# Patient Record
Sex: FEMALE | Employment: STUDENT | URBAN - METROPOLITAN AREA
[De-identification: names, ages, dates, MRNs, and addresses within clinical notes are randomized per-mention and may not be internally consistent; named-entity substitution may affect disease eponyms.]

---
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
## 2025-04-27 ENCOUNTER — TELEPHONE (OUTPATIENT)
Dept: FAMILY MEDICINE CLINIC | Facility: CLINIC | Age: 20
End: 2025-04-27

## 2025-04-28 NOTE — TELEPHONE ENCOUNTER
You are receiving this message because you were on call 25, please advise what advice was given to the patient. Please sign the encounter after you document if there is no further action needed.     Message # [id number]         2025 10:54a   [ANNIEW]  To:  From:  RAÚL Samayoa MD:  Phone#:  ----------------------------------------------------------------------  LOVE HERRERA [phone] RE; PT EMILY HERRERA  2005, COVID POSITIVE TEMP 101 PCP YOURSELF Paged at number :  PAGE: [phone] at :  10:54          (Message Delivered)   D E L I V E R I E S :  2025 10:54a           ANNIEW        Delivered

## 2025-04-29 NOTE — TELEPHONE ENCOUNTER
I spoke with the parents and the patient and gave advisement regarding supportive care treatment for COVID.  Prescription sent to the pharmacy.

## 2025-06-19 ENCOUNTER — LAB ENCOUNTER (OUTPATIENT)
Dept: LAB | Age: 20
End: 2025-06-19
Attending: PHYSICIAN ASSISTANT
Payer: COMMERCIAL

## 2025-06-19 ENCOUNTER — OFFICE VISIT (OUTPATIENT)
Dept: FAMILY MEDICINE CLINIC | Facility: CLINIC | Age: 20
End: 2025-06-19
Payer: COMMERCIAL

## 2025-06-19 VITALS
HEART RATE: 72 BPM | HEIGHT: 64.6 IN | BODY MASS INDEX: 29.85 KG/M2 | DIASTOLIC BLOOD PRESSURE: 78 MMHG | WEIGHT: 177 LBS | SYSTOLIC BLOOD PRESSURE: 127 MMHG

## 2025-06-19 DIAGNOSIS — Z11.3 SCREEN FOR STD (SEXUALLY TRANSMITTED DISEASE): ICD-10-CM

## 2025-06-19 DIAGNOSIS — Z00.00 WELLNESS EXAMINATION: Primary | ICD-10-CM

## 2025-06-19 DIAGNOSIS — J30.0 VASOMOTOR RHINITIS: ICD-10-CM

## 2025-06-19 DIAGNOSIS — Z00.00 WELLNESS EXAMINATION: ICD-10-CM

## 2025-06-19 PROBLEM — Z02.0 SCHOOL PHYSICAL EXAM: Status: RESOLVED | Noted: 2019-08-13 | Resolved: 2025-06-19

## 2025-06-19 LAB
ALBUMIN SERPL-MCNC: 4.5 G/DL (ref 3.2–4.8)
ALBUMIN/GLOB SERPL: 1.6 {RATIO} (ref 1–2)
ALP LIVER SERPL-CCNC: 62 U/L (ref 52–144)
ALT SERPL-CCNC: 18 U/L (ref 10–49)
ANION GAP SERPL CALC-SCNC: 7 MMOL/L (ref 0–18)
AST SERPL-CCNC: 17 U/L (ref ?–34)
BASOPHILS # BLD AUTO: 0.05 X10(3) UL (ref 0–0.2)
BASOPHILS NFR BLD AUTO: 0.8 %
BILIRUB SERPL-MCNC: 0.9 MG/DL (ref 0.3–1.2)
BUN BLD-MCNC: 11 MG/DL (ref 9–23)
BUN/CREAT SERPL: 10.7 (ref 10–20)
CALCIUM BLD-MCNC: 10.1 MG/DL (ref 8.7–10.4)
CHLORIDE SERPL-SCNC: 106 MMOL/L (ref 98–112)
CHOLEST SERPL-MCNC: 160 MG/DL (ref ?–200)
CO2 SERPL-SCNC: 25 MMOL/L (ref 21–32)
CREAT BLD-MCNC: 1.03 MG/DL (ref 0.55–1.02)
DEPRECATED RDW RBC AUTO: 39.1 FL (ref 35.1–46.3)
EGFRCR SERPLBLD CKD-EPI 2021: 80 ML/MIN/1.73M2 (ref 60–?)
EOSINOPHIL # BLD AUTO: 0.22 X10(3) UL (ref 0–0.7)
EOSINOPHIL NFR BLD AUTO: 3.4 %
ERYTHROCYTE [DISTWIDTH] IN BLOOD BY AUTOMATED COUNT: 11.5 % (ref 11–15)
FASTING PATIENT LIPID ANSWER: YES
FASTING STATUS PATIENT QL REPORTED: YES
GLOBULIN PLAS-MCNC: 2.9 G/DL (ref 2–3.5)
GLUCOSE BLD-MCNC: 81 MG/DL (ref 70–99)
HCT VFR BLD AUTO: 37.9 % (ref 35–48)
HDLC SERPL-MCNC: 65 MG/DL (ref 40–59)
HGB BLD-MCNC: 12.1 G/DL (ref 12–16)
IMM GRANULOCYTES # BLD AUTO: 0.01 X10(3) UL (ref 0–1)
IMM GRANULOCYTES NFR BLD: 0.2 %
LDLC SERPL CALC-MCNC: 79 MG/DL (ref ?–100)
LYMPHOCYTES # BLD AUTO: 3.24 X10(3) UL (ref 1–4)
LYMPHOCYTES NFR BLD AUTO: 49.8 %
MCH RBC QN AUTO: 29.7 PG (ref 26–34)
MCHC RBC AUTO-ENTMCNC: 31.9 G/DL (ref 31–37)
MCV RBC AUTO: 93.1 FL (ref 80–100)
MONOCYTES # BLD AUTO: 0.4 X10(3) UL (ref 0.1–1)
MONOCYTES NFR BLD AUTO: 6.1 %
NEUTROPHILS # BLD AUTO: 2.59 X10 (3) UL (ref 1.5–7.7)
NEUTROPHILS # BLD AUTO: 2.59 X10(3) UL (ref 1.5–7.7)
NEUTROPHILS NFR BLD AUTO: 39.7 %
NONHDLC SERPL-MCNC: 95 MG/DL (ref ?–130)
OSMOLALITY SERPL CALC.SUM OF ELEC: 284 MOSM/KG (ref 275–295)
PLATELET # BLD AUTO: 416 10(3)UL (ref 150–450)
POTASSIUM SERPL-SCNC: 4.1 MMOL/L (ref 3.5–5.1)
PROT SERPL-MCNC: 7.4 G/DL (ref 5.7–8.2)
RBC # BLD AUTO: 4.07 X10(6)UL (ref 3.8–5.3)
SODIUM SERPL-SCNC: 138 MMOL/L (ref 136–145)
TRIGL SERPL-MCNC: 84 MG/DL (ref 30–149)
TSI SER-ACNC: 1.14 UIU/ML (ref 0.55–4.78)
VLDLC SERPL CALC-MCNC: 13 MG/DL (ref 0–30)
WBC # BLD AUTO: 6.5 X10(3) UL (ref 4–11)

## 2025-06-19 PROCEDURE — 87591 N.GONORRHOEAE DNA AMP PROB: CPT

## 2025-06-19 PROCEDURE — 87491 CHLMYD TRACH DNA AMP PROBE: CPT

## 2025-06-19 PROCEDURE — 85025 COMPLETE CBC W/AUTO DIFF WBC: CPT

## 2025-06-19 PROCEDURE — 36415 COLL VENOUS BLD VENIPUNCTURE: CPT

## 2025-06-19 PROCEDURE — 80053 COMPREHEN METABOLIC PANEL: CPT

## 2025-06-19 PROCEDURE — 86480 TB TEST CELL IMMUN MEASURE: CPT

## 2025-06-19 PROCEDURE — 80061 LIPID PANEL: CPT

## 2025-06-19 PROCEDURE — 84443 ASSAY THYROID STIM HORMONE: CPT

## 2025-06-19 RX ORDER — LORATADINE 10 MG/1
10 TABLET ORAL DAILY
Qty: 90 TABLET | Refills: 2 | Status: SHIPPED | OUTPATIENT
Start: 2025-06-19

## 2025-06-19 NOTE — PROGRESS NOTES
HPI:   Roldan Mcnulty is a 20 year old female who presents for an Annual Health Visit.   The patient is currently feeling well. The patient denies chest pain, SOB, N/V/C/D, fever, dizziness, syncope, and abdominal pain. There are no other concerns today.      Allergies:   Allergies[1]    CURRENT MEDICATIONS   Current Medications[2]   HISTORICAL INFORMATION   Past Medical History[3]   Past Surgical History[4]   Family History[5]   SOCIAL HISTORY   Short Social Hx on File[6]  Social History     Social History Narrative    Not on file        REVIEW OF SYSTEMS:     Review of Systems   Constitutional: Negative.    HENT: Negative.     Eyes: Negative.    Respiratory: Negative.     Cardiovascular: Negative.    Gastrointestinal: Negative.    Genitourinary: Negative.    Musculoskeletal: Negative.    Skin: Negative.    Neurological: Negative.    Psychiatric/Behavioral: Negative.           EXAM:   /78   Pulse 72   Ht 5' 4.6\" (1.641 m)   Wt 177 lb (80.3 kg)   LMP 06/12/2025 (Approximate)   BMI 29.82 kg/m²    Wt Readings from Last 6 Encounters:   06/19/25 177 lb (80.3 kg)   10/23/24 189 lb (85.7 kg) (96%, Z= 1.77)*   10/11/24 180 lb (81.6 kg) (95%, Z= 1.60)*   11/01/22 179 lb 8 oz (81.4 kg) (95%, Z= 1.69)*   04/04/22 175 lb 9.6 oz (79.7 kg) (95%, Z= 1.65)*   10/19/21 174 lb (78.9 kg) (95%, Z= 1.64)*     * Growth percentiles are based on CDC (Girls, 2-20 Years) data.     Body mass index is 29.82 kg/m².    Physical Exam  Vitals reviewed.   Constitutional:       Appearance: She is well-developed.   HENT:      Head: Normocephalic and atraumatic.      Right Ear: Tympanic membrane, ear canal and external ear normal. There is no impacted cerumen.      Left Ear: Tympanic membrane, ear canal and external ear normal. There is no impacted cerumen.      Nose: Nose normal.      Mouth/Throat:      Mouth: Mucous membranes are moist.      Pharynx: Oropharynx is clear. No oropharyngeal exudate or posterior oropharyngeal  erythema.   Eyes:      General:         Right eye: No discharge.         Left eye: No discharge.      Conjunctiva/sclera: Conjunctivae normal.   Cardiovascular:      Rate and Rhythm: Normal rate and regular rhythm.      Heart sounds: Normal heart sounds.   Pulmonary:      Effort: Pulmonary effort is normal.      Breath sounds: Normal breath sounds.   Abdominal:      General: Abdomen is flat. Bowel sounds are normal. There is no distension.      Palpations: Abdomen is soft.      Tenderness: There is no abdominal tenderness. There is no right CVA tenderness or left CVA tenderness.   Genitourinary:     Vagina: Normal.   Musculoskeletal:         General: Normal range of motion.      Cervical back: Normal range of motion and neck supple.   Skin:     Findings: No rash.   Neurological:      Mental Status: She is alert and oriented to person, place, and time.   Psychiatric:         Mood and Affect: Mood normal.         Behavior: Behavior normal.         Thought Content: Thought content normal.         Judgment: Judgment normal.          ASSESSMENT AND PLAN:   Roldan was seen today for routine physical.    Diagnoses and all orders for this visit:    Wellness examination  -     CBC With Differential With Platelet; Future  -     Comp Metabolic Panel (14); Future  -     Lipid Panel; Future  -     TSH W Reflex To Free T4; Future  -     Quantiferon TB Plus; Future    Screen for STD (sexually transmitted disease)  -     Chlamydia/Gc Amplification; Future    Vasomotor rhinitis  -     loratadine 10 MG Oral Tab; Take 1 tablet (10 mg total) by mouth daily.      Overall health discussed, exercise/activity appropriate for age and health status, heathy diety, preventive care, and upcoming screening discussed. Routine labs ordered.    There are no Patient Instructions on file for this visit.    The patient indicates understanding of these issues and agrees to the plan.    Problem List:  Problem List[7]    Ihsan Carranza  ARTHUR  6/19/2025  10:53 AM               [1]   Allergies  Allergen Reactions    Ragweed ITCHING     SNEEZING      [2]   Current Outpatient Medications   Medication Sig Dispense Refill    loratadine 10 MG Oral Tab Take 1 tablet (10 mg total) by mouth daily. 90 tablet 2    ibuprofen 400 MG Oral Tab TAKE 1 DAY BEFORE MENSTRUAL CYCLE ANTICIPATED TO START AND TO BE TAKEN EVERY DAY DURING THE MENSTRUAL CYCLE 3 TIMES DAILY WITH MEALS. (Patient not taking: Reported on 6/19/2025) 15 tablet 0    Etonogestrel-Ethinyl Estradiol (NUVARING) 0.12-0.015 MG/24HR Vaginal Ring Place 1 Ring vaginally As Directed. For 3 weeks, remove for 1 week (Patient not taking: Reported on 6/19/2025) 3 Ring 3    Benzoyl Peroxide (DESQUAM-X WASH) 10 % External Liquid Apply 1 Application topically at bedtime. (Patient not taking: Reported on 6/19/2025) 150 mL 5    Erythromycin 2 % External Pads Apply 1 Application topically at bedtime. To be applied after face washing nightly. (Patient not taking: Reported on 6/19/2025) 60 each 0    Mometasone Furoate (NASONEX) 50 MCG/ACT Nasal Suspension 1 spray by Nasal route daily. (Patient not taking: Reported on 6/19/2025) 17 g 1    Diclofenac Potassium 50 MG Oral Tab Take 1 tablet (50 mg total) by mouth 3 (three) times daily. (Patient not taking: Reported on 6/19/2025) 30 tablet 5    Erythromycin 2 % External Pads Apply 1 Application topically nightly. To be applied after washing face. (Patient not taking: Reported on 6/19/2025) 60 each 2    Benzoyl Peroxide (DESQUAM-X WASH) 5 % External Liquid Apply 1 Application topically nightly. (Patient not taking: Reported on 6/19/2025) 1 Bottle 2   [3]   Past Medical History:   Allergic rhinitis    Dysmenorrhea   [4]   Past Surgical History:  Procedure Laterality Date    Adenoidectomy  11/2010    T&A    Tonsillectomy  11/2010    T&A   [5]   Family History  Problem Relation Age of Onset    Other (Other) Maternal Grandmother         sarcoidosis    Cancer Paternal Grandmother          cancer-unsure of kind   [6]   Social History  Socioeconomic History    Marital status: Single   Tobacco Use    Smoking status: Never    Smokeless tobacco: Never   Vaping Use    Vaping status: Never Used   Substance and Sexual Activity    Alcohol use: No    Drug use: No    Sexual activity: Never   Other Topics Concern    Caffeine Concern Yes     Comment: soda, occasionally   [7]   Patient Active Problem List  Diagnosis    Multiple environmental allergies    Allergic rhinitis    Congestion of nasal sinus    Well child check    School physical exam    Mental or behavioral problem    Motion sickness

## 2025-06-20 LAB
C TRACH DNA SPEC QL NAA+PROBE: NEGATIVE
N GONORRHOEA DNA SPEC QL NAA+PROBE: NEGATIVE

## 2025-06-24 ENCOUNTER — TELEPHONE (OUTPATIENT)
Dept: FAMILY MEDICINE CLINIC | Facility: CLINIC | Age: 20
End: 2025-06-24

## 2025-06-24 LAB
M TB IFN-G CD4+ T-CELLS BLD-ACNC: 0.03 IU/ML
M TB TUBERC IFN-G BLD QL: POSITIVE
M TB TUBERC IGNF/MITOGEN IGNF CONTROL: >10 IU/ML
QFT TB1 AG MINUS NIL: 0 IU/ML
QFT TB2 AG MINUS NIL: >10 IU/ML

## 2025-06-24 NOTE — TELEPHONE ENCOUNTER
This encounter closed.   MyChart sent .     See 6/19/25 LABS ;   So Peñaloza RN  6/24/2025  1:39 PM CDT Back to Top      Left message to call back.  First attempt.    Ihsan Carranza PA-C  6/24/2025  1:21 PM CDT       Quantiferon is positive. Chest x-ray ordered.         No future appointments.

## 2025-06-25 ENCOUNTER — HOSPITAL ENCOUNTER (OUTPATIENT)
Dept: GENERAL RADIOLOGY | Age: 20
Discharge: HOME | End: 2025-06-25
Attending: PHYSICIAN ASSISTANT
Payer: COMMERCIAL

## 2025-06-25 DIAGNOSIS — R76.12 POSITIVE QUANTIFERON-TB GOLD TEST: ICD-10-CM

## 2025-06-25 PROCEDURE — 71046 X-RAY EXAM CHEST 2 VIEWS: CPT | Performed by: PHYSICIAN ASSISTANT

## 2025-06-25 NOTE — TELEPHONE ENCOUNTER
Patient returned our call ( Identified name and date of birth )     Reviewed with patient the following message   Patient verbalizes understanding and agrees with treatment  plan.       Jr Carranza says you tested positive for the TB test (QuantiFERON) and she wants you to get a chest X-ray; the order is in the system. I recommend calling central scheduling at [phone] to schedule your X-ray appointment, mention that you are positive for TB, and wear a mask when you go.

## 2025-06-26 ENCOUNTER — TELEPHONE (OUTPATIENT)
Dept: FAMILY MEDICINE CLINIC | Facility: CLINIC | Age: 20
End: 2025-06-26

## 2025-06-26 DIAGNOSIS — R76.12 POSITIVE QUANTIFERON-TB GOLD TEST: Primary | ICD-10-CM

## 2025-06-26 NOTE — TELEPHONE ENCOUNTER
Patient has questions regarding test result. Wants to know If she should get another test done to check if its false positive.

## 2025-06-27 NOTE — TELEPHONE ENCOUNTER
If this is the first positive quant gold test she's had, given her normal CXR, I have reordered the blood test. She can get this done at her convenience.

## 2025-07-03 ENCOUNTER — LAB ENCOUNTER (OUTPATIENT)
Dept: LAB | Age: 20
End: 2025-07-03
Attending: FAMILY MEDICINE
Payer: COMMERCIAL

## 2025-07-03 DIAGNOSIS — R76.12 POSITIVE QUANTIFERON-TB GOLD TEST: ICD-10-CM

## 2025-07-03 PROCEDURE — 86480 TB TEST CELL IMMUN MEASURE: CPT

## 2025-07-03 PROCEDURE — 36415 COLL VENOUS BLD VENIPUNCTURE: CPT

## 2025-07-05 LAB
M TB IFN-G CD4+ T-CELLS BLD-ACNC: 0.01 IU/ML
M TB TUBERC IFN-G BLD QL: NEGATIVE
M TB TUBERC IGNF/MITOGEN IGNF CONTROL: >10 IU/ML
QFT TB1 AG MINUS NIL: 0 IU/ML
QFT TB2 AG MINUS NIL: 0 IU/ML

## (undated) DIAGNOSIS — N94.6 DYSMENORRHEA IN ADOLESCENT: ICD-10-CM

## (undated) NOTE — LETTER
[facility] [employer] of Child Health Examination       Student's Name  Tang Tong A Birth Da Date     Signature                                                                                                                                              Title                           Date    (If adding dates to the above immu ALLERGIES  (Food, drug, insect, other)  Review of patient's allergies indicates no known allergies.  MEDICATION  (List all prescribed or taken on a regular basis.)    Current Outpatient Prescriptions:   •  Benzoyl Peroxide (DESQUAM-X [address]) 5 % External Liqu age 48 (Cause?)    Yes   No    Eye/Vision problems?   Yes  No   Glasses  Yes   No  Contacts  Yes    No   Last eye exam___  Other concerns? (crossed eye, drooping lids, squinting, difficulty reading) Dental:  ____Braces    ____Bridge    ____Plate    ____Othe Blood Test:   Date Reported          /      /              Result:                  Value ______________               LAB TESTS (Recommended) Date Results  Date Results   Hemoglobin or Hematocrit   Sickle Cell  (when indicated)     Urinalysis   Dev Physician/Advanced Practice Nurse/Physician Assistant performing examination  Print Name  Kaelyn Nguyen, DO                                                 Signature                                                                                Date

## (undated) NOTE — LETTER
Name:  Amaya Mariano Year:  9th Grade Class: Student ID No.:   Address:   [address] [place] [address] Phone:  [phone] (home)  :  15year old   Name Relationship [address] Work Phone Home Phone Mobile Phone   1.  Ever Pen 9.  Has a doctor ever ordered a test for your heart? For example, ECG/EKG. Echocardiogram) No   10. Do you get lightheaded or feel more short of breath than expected during exercise? No   11. Have you ever had an unexplained seizure? No   12.  Do you get mo 26. Do you cough, wheeze, or have difficulty breathing during or after exercise? No   27. Have you ever used an inhaler or taken asthma medication? No   28. Is there anyone in your family who has asthma? No   29.  Were you born without or are you missing a 55. How many periods have you had in the last 12 months? Explain \"yes\" answers here:   ____________________________________            I hereby state that, to the best of my knowledge, my answers to the above questions are complete and correct.  8/13/2 On the basis of the examination on this day, I approve this child's participation in interscholastic sports for 395 days from this date.    Limited:No                                                                    Examination Date: 8/13/2019   Additiona A complete list of the current IHSA Banned Substance Classes can be accessed at http://[email or web address] ihsa.org/initiatives/sportsMedicine/files/IHSA_banned_substance_classes. pdf             Signature of student-athlete Date Signature of parent-guardian Date        ©2

## (undated) NOTE — Clinical Note
2/7/2017          To Whom It May Concern:    Shantal Talbert is currently under my medical care and may not return to school at this time. Please excuse Adelina Orellana for 4 days beginning 01/24/17 and including 01/25/17, 02/07/17, and 02/08/17.   She may retur

## (undated) NOTE — LETTER
10/23/2024        Roldan Mcnulty        [address] [address]         To Whom It May Concern,    Ms Roldan mcnulty is my patient and is cleared to return to work tomorrow 10/24/24. If you should have any questions please feel free to contact me      Sincerely,          Sabrina Echevarria MD  [address] [place] [id number]  Ph: [phone]  Fax: [phone]

## (undated) NOTE — MR AVS SNAPSHOT
[facility]  [address], [address]  [phone]               Thank you for choosing us for your health care visit with Jason Lozoya DO.   We are glad to serve you and happy to provide you with this sum loratadine 10 MG Tabs   Take 1 tablet (10 mg total) by mouth daily. Commonly known as:  CLARITIN           Mometasone Furoate 50 MCG/ACT Susp   1 spray by Nasal route daily.    What changed:    - how much to take  - how to take this  - when to take this help them live a healthy active lifestyle.     To lead a healthy active life, families can strive to reach these goals:  o 5 servings of fruits and vegetables a day  o 4 servings of water a day  o 3 servings of low-fat dairy a day  o 2 or less hours of scre

## (undated) NOTE — LETTER
Name:  Jason Kosta School Year:  {GRADE:1366} Class: Student ID No.:   Address:   [address]   [address] [place] [address] Phone:  [phone] (home)  :  15year old   Name Relationship [address] Work Phone Home Phone Mobile Phone   1.  SHORT,MY 10. Do you get lightheaded or feel more short of breath than expected during exercise? No   11. Have you ever had an unexplained seizure? No   12. Do you get more tired or short of breath more quickly than your friends during exercise?  No   HEART HEALTH QU 27. Have you ever used an inhaler or taken asthma medication? No   28. Is there anyone in your family who has asthma? No   29. Were you born without or are you missing a kidney, eye, testicle (males), spleen, or any other organ? No   30.  Do you have a groi Explain \"yes\" answers here:   ____________________________________            I hereby state that, to the best of my knowledge, my answers to the above questions are complete and correct.  8/7/2018    Signature of athlete: ________________________________ participation in interscholastic sports for 395 days from this date.    Limited:No                                                                    Examination Date: 8/7/2018   Additional Comments:         [de-identified] Signature     Physician Assistant Sig http://www. ihsa.org/initiatives/sportsMedicine/files/IHSA_banned_substance_classes. pdf             Signature of student-athlete Date Signature of parent-guardian Date        ©2010 AAFP, AAP, [facility], [facility]. for

## (undated) NOTE — LETTER
1/30/2018          To Whom It May Concern:    Amparo Solares is currently under my medical care and was seen in clinic for an acute medical visit. Please excuse her absence from 1/29/18 - 1/30/18, she is cleared to return on 1/31/18.      If you require ad

## (undated) NOTE — LETTER
Name:  Kenny Stager School Year:  11th Grade Class: Student ID No.:   Address:  [address] [place] [address] [address] [place] [address] Phone:  [phone] (home)  :  12year old   Name Relationship [address] Work Phone Home Phone Mobile Phone   1.  Yonathan Slaughter 1.  Has a doctor ever denied or restricted your participation in sports for any reason? No   2. Do you have any ongoing medical condition? If so, please identify below: N/A No   3. Have you ever spent the night in the hospital? No   4.   Have you ever h injury that required xrays, MRI, CT scan, injections, therapy, a brace, a cast, or crutches? No   20. Have you ever had a stress fracture? No   21.  Have you ever been told that you have or have you had an xray for neck instability or atlanto-axial instabil you had any eye injuries? No   45. Do you wear glasses or contact lenses? No   46. Do you wear protective eyewear (goggles, face shield)? No   47. Do you worry about your weight? No   48. Are you trying or has anyone recommended you gain or lose weight?  No · HSV, lesions suggestive of MRSA, tinea corporis Yes    Neurologic* Yes    MUSCULOSKELETAL     Neck Yes    Back Yes    Shoulder/arm Yes    Elbow/forearm Yes    Wrist/hand/fingers Yes    Hip/thigh Yes    Knee Yes    Leg/ankle Yes    Foot/toes Yes    Func results of the performance-enhancing substance testing may be provided to certain individuals in my/our student’s high school as specified in the [facility] Performance-Enhancing Substance Testing Program Protocol which is available on the [employer] website at www. [address] [place] [address]

## (undated) NOTE — LETTER
[facility] [employer] Child Health Examination       Student's Name  Krystyna Mcnulty Birth D Date     Signature                                                                                                                                              Title                           Date    (If adding dates to the above immu ALLERGIES  (Food, drug, insect, other)  Patient has no known allergies.  MEDICATION  (List all prescribed or taken on a regular basis.)    Current Outpatient Medications:   •  Diclofenac Potassium 50 MG Oral Tab, Take 1 tablet (50 mg total) by mouth 3 (thre Eye/Vision problems? Yes  No   Glasses  Yes   No  Contacts  Yes    No   Last eye exam___  Other concerns? (crossed eye, drooping lids, squinting, difficulty reading) Dental:  ____Braces    ____Bridge    ____Plate    ____Other  Other concerns?      Ear/Hear Result:  None             mm    ______________                         Blood Test:   Date Reported          /      /              Result:                  Value ______________               LAB TESTS (Recommended) Date Results  Date Results   Hemoglobin or PHYSICAL EDUCATION    Yes      INTERSCHOLASTIC SPORTS   Yes   Physician/Advanced Practice Nurse/Physician Assistant performing examination  Print Name  Hussein Lee [place]                                                 Signature